# Patient Record
Sex: MALE | Race: WHITE | NOT HISPANIC OR LATINO | URBAN - METROPOLITAN AREA
[De-identification: names, ages, dates, MRNs, and addresses within clinical notes are randomized per-mention and may not be internally consistent; named-entity substitution may affect disease eponyms.]

---

## 2017-03-28 ENCOUNTER — EMERGENCY (EMERGENCY)
Facility: HOSPITAL | Age: 22
LOS: 0 days | Discharge: HOME | End: 2017-03-28
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
## 2017-06-27 DIAGNOSIS — Z88.0 ALLERGY STATUS TO PENICILLIN: ICD-10-CM

## 2017-06-27 DIAGNOSIS — S93.401A SPRAIN OF UNSPECIFIED LIGAMENT OF RIGHT ANKLE, INITIAL ENCOUNTER: ICD-10-CM

## 2017-06-27 DIAGNOSIS — M25.571 PAIN IN RIGHT ANKLE AND JOINTS OF RIGHT FOOT: ICD-10-CM

## 2022-01-11 ENCOUNTER — EMERGENCY (EMERGENCY)
Facility: HOSPITAL | Age: 27
LOS: 0 days | Discharge: HOME | End: 2022-01-11
Attending: EMERGENCY MEDICINE | Admitting: EMERGENCY MEDICINE
Payer: MEDICAID

## 2022-01-11 VITALS
SYSTOLIC BLOOD PRESSURE: 121 MMHG | HEART RATE: 71 BPM | DIASTOLIC BLOOD PRESSURE: 65 MMHG | WEIGHT: 177.91 LBS | OXYGEN SATURATION: 97 % | TEMPERATURE: 98 F | RESPIRATION RATE: 16 BRPM
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
## 2022-01-11 DIAGNOSIS — M25.571 PAIN IN RIGHT ANKLE AND JOINTS OF RIGHT FOOT: ICD-10-CM

## 2022-01-11 DIAGNOSIS — S83.90XA SPRAIN OF UNSPECIFIED SITE OF UNSPECIFIED KNEE, INITIAL ENCOUNTER: ICD-10-CM
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
## 2022-01-11 PROCEDURE — 73562 X-RAY EXAM OF KNEE 3: CPT | Mod: 26,RT

## 2022-01-11 PROCEDURE — 99283 EMERGENCY DEPT VISIT LOW MDM: CPT

## 2022-01-11 NOTE — ED PROVIDER NOTE - CLINICAL SUMMARY MEDICAL DECISION MAKING FREE TEXT BOX
pt presenting with R knee pain sp twisting mechanism while boxing. no numbness/focal weakness. +pain on WB. Well appearing, NAD, non toxic. NCAT PERRLA WWPx4 neuro non focal 2+ equal pulses throughout <2sec capillary refill throughout no gross deformities to knee. Comfortable with discharge and follow-up outpatient, strict return precautions given. Endorses understanding of all of this and aware that they can return at any time for new or concerning symptoms. No further questions or concerns at this time

## 2022-01-11 NOTE — ED PROVIDER NOTE - PATIENT PORTAL LINK FT
You can access the FollowMyHealth Patient Portal offered by [facility] by registering at the following website: [email or web address]. By joining [employer]’s FollowMyHealth portal, you will also be able to view your health information using other applications (apps) compatible with our system.

## 2022-01-11 NOTE — ED PROVIDER NOTE - NS ED ROS FT
ROS:     constitutional: no fever, weight loss  msk: right knee pain joint pain or difficulty ROM  skin: no laceration or swelling or bruising  neuro: no tingling , weakness , difficulty ambulation

## 2022-01-11 NOTE — ED PROVIDER NOTE - OBJECTIVE STATEMENT
27 y/o male presents to the ED with posterior right knee pain s/p injury prior to arrival. patient without swelling to knee. no deformity. patient c/o pain with extension. no tingling to toes. no hip or ankle pain. no back pain or abdominal pain. patient with prior injury to right knee.

## 2022-01-11 NOTE — ED ADULT TRIAGE NOTE - CHIEF COMPLAINT QUOTE
"I didn't stretch before my boxing workout. I threw a punch and immediately felt pressure to my right knee. It hurts to walk on; hurts to go up the stairs."

## 2022-01-11 NOTE — ED PROVIDER NOTE - CARE PROVIDER_API CALL
Julio C Joy)  Orthopaedic Surgery  [address] [place] [address]  Phone: [phone]  Fax: [phone]  Follow Up Time:

## 2022-01-11 NOTE — ED PROVIDER NOTE - PHYSICAL EXAMINATION
Vital Signs: I have reviewed the initial vital signs.  Constitutional: well-nourished, no acute distress  Musculoskeletal: right knee- +tenderness posterior knee, good ROM of extremity,  no bony tenderness, no deformity, good peripheral pulses  Integumentary: (-) laceration, (-) ecchymosis (-) swelling   Neurologic: awake, alert, extremities’ motor and sensory functions grossly intact, no focal deficits  heme: (-) no adenopathy (-)lymphangitis

## 2023-12-01 NOTE — ED ADULT NURSE NOTE - CAS DISCH ACCOMP BY

## 2025-04-03 ENCOUNTER — EMERGENCY (EMERGENCY)
Facility: HOSPITAL | Age: 30
LOS: 0 days | Discharge: HOME | End: 2025-04-03
Attending: EMERGENCY MEDICINE
Payer: MEDICAID

## 2025-04-03 VITALS
SYSTOLIC BLOOD PRESSURE: 120 MMHG | OXYGEN SATURATION: 99 % | RESPIRATION RATE: 18 BRPM | HEART RATE: 75 BPM | WEIGHT: 169.98 LBS | TEMPERATURE: 98 F | DIASTOLIC BLOOD PRESSURE: 84 MMHG

## 2025-04-03 PROBLEM — Z78.9 OTHER SPECIFIED HEALTH STATUS: Chronic | Status: ACTIVE | Noted: 2022-01-11

## 2025-04-03 PROCEDURE — 99284 EMERGENCY DEPT VISIT MOD MDM: CPT

## 2025-04-03 PROCEDURE — 99283 EMERGENCY DEPT VISIT LOW MDM: CPT | Mod: 25

## 2025-04-03 PROCEDURE — 73562 X-RAY EXAM OF KNEE 3: CPT | Mod: RT

## 2025-04-03 PROCEDURE — 73562 X-RAY EXAM OF KNEE 3: CPT | Mod: 26,RT

## 2025-04-03 RX ORDER — LIDOCAINE HYDROCHLORIDE 20 MG/ML
1 JELLY TOPICAL ONCE
Refills: 0 | Status: COMPLETED | OUTPATIENT
Start: 2025-04-03 | End: 2025-04-03

## 2025-04-03 RX ORDER — ACETAMINOPHEN 500 MG/5ML
975 LIQUID (ML) ORAL ONCE
Refills: 0 | Status: COMPLETED | OUTPATIENT
Start: 2025-04-03 | End: 2025-04-03

## 2025-04-03 RX ORDER — KETOROLAC TROMETHAMINE 30 MG/ML
30 INJECTION, SOLUTION INTRAMUSCULAR; INTRAVENOUS ONCE
Refills: 0 | Status: DISCONTINUED | OUTPATIENT
Start: 2025-04-03 | End: 2025-04-03

## 2025-04-03 RX ADMIN — Medication 975 MILLIGRAM(S): at 14:14

## 2025-04-03 RX ADMIN — LIDOCAINE HYDROCHLORIDE 1 PATCH: 20 JELLY TOPICAL at 14:14

## 2025-04-03 NOTE — ED PROVIDER NOTE - PATIENT PORTAL LINK FT
You can access the FollowMyHealth Patient Portal offered by [facility] by registering at the following website: [email or web address]. By joining [employer]’s FollowMyHealth portal, you will also be able to view your health information using other applications (apps) compatible with our system.

## 2025-04-03 NOTE — ED ADULT TRIAGE NOTE - CHIEF COMPLAINT QUOTE
pt c/o right knee pain, unable to straighten RLE or bear weight   states he fell on it snowboarding last week

## 2025-04-03 NOTE — ED PROVIDER NOTE - OBJECTIVE STATEMENT
29 M no hx presenting to ED for eval of right knee pain for few days s/p snowboarding. sts that he came off jump but landed oddly and since has bene having pain to knee with ROM. denies any trauma to head, loc, or blood thinners

## 2025-04-03 NOTE — ED ADULT NURSE NOTE - PRO INTERPRETER NEED 2
Before Your Surgery      Call your surgeon if there is any change in your health. This includes signs of a cold or flu (such as a sore throat, runny nose, cough, rash or fever).    Do not smoke, drink alcohol or take over the counter medicine (unless your surgeon or primary care doctor tells you to) for the 24 hours before and after surgery.    If you take prescribed drugs: Follow your doctor s orders about which medicines to take and which to stop until after surgery.    Eating and drinking prior to surgery: follow the instructions from your surgeon    Take a shower or bath the night before surgery. Use the soap your surgeon gave you to gently clean your skin. If you do not have soap from your surgeon, use your regular soap. Do not shave or scrub the surgery site.  Wear clean pajamas and have clean sheets on your bed.   
English

## 2025-04-03 NOTE — ED PROVIDER NOTE - CLINICAL SUMMARY MEDICAL DECISION MAKING FREE TEXT BOX
VSS.  N/V exam normal.  Patient with medial joint line tenderness, clicking and locking.  XRay  negative.  Cannot place immobilizer as patient unable to fully extend knee, pointing to likely meniscal impingement.  ACE wrap, crutches and f/u with Ortho.  Placed in ED referral program.  Strict return instructions discussed. VSS.  N/V exam normal.  Patient with medial joint line tenderness, clicking and locking.  XRay  negative.  Cannot place immobilizer as patient unable to fully extend knee, pointing to likely meniscal impingement.  ACE wrap, crutches and f/u with Ortho.  RICE instructions discussed. Placed in ED referral program.  Strict return instructions discussed.

## 2025-04-03 NOTE — ED PROVIDER NOTE - PHYSICAL EXAMINATION
VITAL SIGNS: I have reviewed nursing notes and confirm.  CONSTITUTIONAL:  in no acute distress.  SKIN: Skin exam is warm and dry, no acute rash.  HEAD: Normocephalic; atraumatic.  EYES: PERRL, EOM intact; conjunctiva and sclera clear.  ENT: No nasal discharge; airway clear.   NECK: Supple; non tender.  CARD: S1, S2 normal; no murmurs, gallops, or rubs. Regular rate and rhythm.  RESP: No wheezes, rales or rhonchi. Speaking in full sentences.   ABD: Normal bowel sounds; soft; non-distended; non-tender; No rebound or guarding. No CVA tenderness.  EXT: decreased ROM to right knee, No clubbing, cyanosis or edema.  NEURO: Alert, oriented. Grossly unremarkable. No focal deficits.

## 2025-04-03 NOTE — ED PROVIDER NOTE - NSFOLLOWUPINSTRUCTIONS_ED_ALL_ED_FT
Our Emergency Department Referral Coordinators will be reaching out to you in the next 24-48 hours from 9:00am to 5:00pm with a follow up appointment. Please expect a phone call from the hospital in that time frame. If you do not receive a call or if you have any questions or concerns, you can reach them at   [phone]    __________________________________________________________________________________________        Knee Pain    Knee pain is a very common symptom and can have many causes. Knee pain often goes away when you follow your health care provider's instructions for relieving pain and discomfort at home. However, knee pain can develop into a condition that needs treatment. Some conditions may include:     Arthritis caused by wear and tear (osteoarthritis).  Arthritis caused by swelling and irritation (rheumatoid arthritis or gout).  A cyst or growth in your knee.  An infection in your knee joint.  An injury that will not heal.  Damage, swelling, or irritation of the tissues that support your knee (torn ligaments or tendinitis).    If your knee pain continues, additional tests may be ordered to diagnose your condition. Tests may include X-rays or other imaging studies of your knee. You may also need to have fluid removed from your knee. Treatment for ongoing knee pain depends on the cause, but treatment may include:    Medicines to relieve pain or swelling.  Steroid injections in your knee.  Physical therapy.  Surgery.    HOME CARE INSTRUCTIONS  Take medicines only as directed by your health care provider.   Rest your knee and keep it raised (elevated) while you are resting.  Do not do things that cause or worsen pain.  Avoid high-impact activities or exercises, such as running, jumping rope, or doing jumping jacks.  Apply ice to the knee area:  Put ice in a plastic bag.  Place a towel between your skin and the bag.  Leave the ice on for 20 minutes, 2–3 times a day.  Ask your health care provider if you should wear an elastic knee support.  Keep a pillow under your knee when you sleep.  Lose weight if you are overweight. Extra weight can put pressure on your knee.  Do not use any tobacco products, including cigarettes, chewing tobacco, or electronic cigarettes. If you need help quitting, ask your health care provider. Smoking may slow the healing of any bone and joint problems that you may have.    SEEK MEDICAL CARE IF:  Your knee pain continues, changes, or gets worse.  You have a fever along with knee pain.  Your knee tone or locks up.  Your knee becomes more swollen.    SEEK IMMEDIATE MEDICAL CARE IF:  Your knee joint feels hot to the touch.  You have chest pain or trouble breathing.

## 2025-04-08 ENCOUNTER — EMERGENCY (EMERGENCY)
Facility: HOSPITAL | Age: 30
LOS: 0 days | Discharge: HOME | End: 2025-04-08
Attending: EMERGENCY MEDICINE
Payer: MEDICAID

## 2025-04-08 VITALS
OXYGEN SATURATION: 95 % | RESPIRATION RATE: 18 BRPM | SYSTOLIC BLOOD PRESSURE: 132 MMHG | DIASTOLIC BLOOD PRESSURE: 84 MMHG | HEART RATE: 77 BPM | TEMPERATURE: 98 F | WEIGHT: 175.05 LBS

## 2025-04-08 DIAGNOSIS — G89.29 OTHER CHRONIC PAIN: ICD-10-CM

## 2025-04-08 DIAGNOSIS — M25.461 EFFUSION, RIGHT KNEE: ICD-10-CM

## 2025-04-08 DIAGNOSIS — M25.561 PAIN IN RIGHT KNEE: ICD-10-CM

## 2025-04-08 LAB
ALBUMIN SERPL ELPH-MCNC: 4.2 G/DL — SIGNIFICANT CHANGE UP (ref 3.5–5.2)
ALP SERPL-CCNC: 58 U/L — SIGNIFICANT CHANGE UP (ref 30–115)
ALT FLD-CCNC: 10 U/L — SIGNIFICANT CHANGE UP (ref 0–41)
ANION GAP SERPL CALC-SCNC: 10 MMOL/L — SIGNIFICANT CHANGE UP (ref 7–14)
AST SERPL-CCNC: 14 U/L — SIGNIFICANT CHANGE UP (ref 0–41)
B PERT IGG+IGM PNL SER: ABNORMAL
BASOPHILS # BLD AUTO: 0.04 K/UL — SIGNIFICANT CHANGE UP (ref 0–0.2)
BASOPHILS NFR BLD AUTO: 0.5 % — SIGNIFICANT CHANGE UP (ref 0–1)
BILIRUB SERPL-MCNC: 0.4 MG/DL — SIGNIFICANT CHANGE UP (ref 0.2–1.2)
BUN SERPL-MCNC: 21 MG/DL — HIGH (ref 10–20)
CALCIUM SERPL-MCNC: 9.1 MG/DL — SIGNIFICANT CHANGE UP (ref 8.4–10.5)
CHLORIDE SERPL-SCNC: 105 MMOL/L — SIGNIFICANT CHANGE UP (ref 98–110)
CO2 SERPL-SCNC: 26 MMOL/L — SIGNIFICANT CHANGE UP (ref 17–32)
COLOR FLD: SIGNIFICANT CHANGE UP
CREAT SERPL-MCNC: 0.8 MG/DL — SIGNIFICANT CHANGE UP (ref 0.7–1.5)
CRP SERPL-MCNC: 30.8 MG/L — HIGH
EGFR: 123 ML/MIN/1.73M2 — SIGNIFICANT CHANGE UP
EGFR: 123 ML/MIN/1.73M2 — SIGNIFICANT CHANGE UP
EOSINOPHIL # BLD AUTO: 0.12 K/UL — SIGNIFICANT CHANGE UP (ref 0–0.7)
EOSINOPHIL NFR BLD AUTO: 1.4 % — SIGNIFICANT CHANGE UP (ref 0–8)
ERYTHROCYTE [SEDIMENTATION RATE] IN BLOOD: 17 MM/HR — HIGH (ref 0–10)
FLUID INTAKE SUBSTANCE CLASS: SIGNIFICANT CHANGE UP
GLUCOSE FLD-MCNC: 98 MG/DL — SIGNIFICANT CHANGE UP
GLUCOSE SERPL-MCNC: 96 MG/DL — SIGNIFICANT CHANGE UP (ref 70–99)
GRAM STN FLD: SIGNIFICANT CHANGE UP
HCT VFR BLD CALC: 39.4 % — LOW (ref 42–52)
HGB BLD-MCNC: 13.8 G/DL — LOW (ref 14–18)
IMM GRANULOCYTES NFR BLD AUTO: 0.4 % — HIGH (ref 0.1–0.3)
JOINT PATHOGENS PNL SNV NAA+NON-PROBE: SIGNIFICANT CHANGE UP
JOINT PCR SOURCE: SIGNIFICANT CHANGE UP
LACTATE SERPL-SCNC: 0.5 MMOL/L — LOW (ref 0.7–2)
LYMPHOCYTES # BLD AUTO: 1.59 K/UL — SIGNIFICANT CHANGE UP (ref 1.2–3.4)
LYMPHOCYTES # BLD AUTO: 19 % — LOW (ref 20.5–51.1)
LYMPHOCYTES # FLD: 9 — SIGNIFICANT CHANGE UP
MCHC RBC-ENTMCNC: 31.4 PG — HIGH (ref 27–31)
MCHC RBC-ENTMCNC: 35 G/DL — SIGNIFICANT CHANGE UP (ref 32–37)
MCV RBC AUTO: 89.7 FL — SIGNIFICANT CHANGE UP (ref 80–94)
MONOCYTES # BLD AUTO: 1.18 K/UL — HIGH (ref 0.1–0.6)
MONOCYTES NFR BLD AUTO: 14.1 % — HIGH (ref 1.7–9.3)
MONOS+MACROS # FLD: 10 % — SIGNIFICANT CHANGE UP
NEUTROPHILS # BLD AUTO: 5.42 K/UL — SIGNIFICANT CHANGE UP (ref 1.4–6.5)
NEUTROPHILS NFR BLD AUTO: 64.6 % — SIGNIFICANT CHANGE UP (ref 42.2–75.2)
NEUTROPHILS-BODY FLUID: 81 % — SIGNIFICANT CHANGE UP
NRBC BLD AUTO-RTO: 0 /100 WBCS — SIGNIFICANT CHANGE UP (ref 0–0)
PLATELET # BLD AUTO: 229 K/UL — SIGNIFICANT CHANGE UP (ref 130–400)
PMV BLD: 9.8 FL — SIGNIFICANT CHANGE UP (ref 7.4–10.4)
POTASSIUM SERPL-MCNC: 4.7 MMOL/L — SIGNIFICANT CHANGE UP (ref 3.5–5)
POTASSIUM SERPL-SCNC: 4.7 MMOL/L — SIGNIFICANT CHANGE UP (ref 3.5–5)
PROT FLD-MCNC: 4.5 G/DL — SIGNIFICANT CHANGE UP
PROT SERPL-MCNC: 6.7 G/DL — SIGNIFICANT CHANGE UP (ref 6–8)
RBC # BLD: 4.39 M/UL — LOW (ref 4.7–6.1)
RBC # FLD: 11.9 % — SIGNIFICANT CHANGE UP (ref 11.5–14.5)
RCV VOL RI: HIGH /UL (ref 0–0)
SODIUM SERPL-SCNC: 141 MMOL/L — SIGNIFICANT CHANGE UP (ref 135–146)
SPECIMEN SOURCE: SIGNIFICANT CHANGE UP
SYNOVIAL CRYSTALS CLARITY: ABNORMAL
SYNOVIAL CRYSTALS COLOR: ABNORMAL
SYNOVIAL CRYSTALS ID: ABNORMAL
SYNOVIAL CRYSTALS TUBE: SIGNIFICANT CHANGE UP
TOTAL NUCLEATED CELL COUNT, BODY FLUID: 2228 /UL — SIGNIFICANT CHANGE UP
TUBE TYPE: SIGNIFICANT CHANGE UP
WBC # BLD: 8.38 K/UL — SIGNIFICANT CHANGE UP (ref 4.8–10.8)
WBC # FLD AUTO: 8.38 K/UL — SIGNIFICANT CHANGE UP (ref 4.8–10.8)

## 2025-04-08 PROCEDURE — 36415 COLL VENOUS BLD VENIPUNCTURE: CPT

## 2025-04-08 PROCEDURE — 99284 EMERGENCY DEPT VISIT MOD MDM: CPT | Mod: 25

## 2025-04-08 PROCEDURE — 20610 DRAIN/INJ JOINT/BURSA W/O US: CPT | Mod: RT

## 2025-04-08 PROCEDURE — 76882 US LMTD JT/FCL EVL NVASC XTR: CPT | Mod: 26,RT

## 2025-04-08 PROCEDURE — 76882 US LMTD JT/FCL EVL NVASC XTR: CPT | Mod: RT

## 2025-04-08 PROCEDURE — 87070 CULTURE OTHR SPECIMN AEROBIC: CPT

## 2025-04-08 PROCEDURE — 99285 EMERGENCY DEPT VISIT HI MDM: CPT | Mod: 25

## 2025-04-08 PROCEDURE — 82945 GLUCOSE OTHER FLUID: CPT

## 2025-04-08 PROCEDURE — 87205 SMEAR GRAM STAIN: CPT

## 2025-04-08 PROCEDURE — 87015 SPECIMEN INFECT AGNT CONCNTJ: CPT

## 2025-04-08 PROCEDURE — 89060 EXAM SYNOVIAL FLUID CRYSTALS: CPT

## 2025-04-08 PROCEDURE — 84157 ASSAY OF PROTEIN OTHER: CPT

## 2025-04-08 PROCEDURE — 87075 CULTR BACTERIA EXCEPT BLOOD: CPT

## 2025-04-08 PROCEDURE — 89051 BODY FLUID CELL COUNT: CPT

## 2025-04-08 PROCEDURE — 86140 C-REACTIVE PROTEIN: CPT

## 2025-04-08 PROCEDURE — 85025 COMPLETE CBC W/AUTO DIFF WBC: CPT

## 2025-04-08 PROCEDURE — 87999 UNLISTED MICROBIOLOGY PX: CPT

## 2025-04-08 PROCEDURE — 96374 THER/PROPH/DIAG INJ IV PUSH: CPT | Mod: XU

## 2025-04-08 PROCEDURE — 83605 ASSAY OF LACTIC ACID: CPT

## 2025-04-08 PROCEDURE — 80053 COMPREHEN METABOLIC PANEL: CPT

## 2025-04-08 PROCEDURE — 85652 RBC SED RATE AUTOMATED: CPT

## 2025-04-08 RX ORDER — KETOROLAC TROMETHAMINE 30 MG/ML
15 INJECTION, SOLUTION INTRAMUSCULAR; INTRAVENOUS ONCE
Refills: 0 | Status: DISCONTINUED | OUTPATIENT
Start: 2025-04-08 | End: 2025-04-08

## 2025-04-08 RX ORDER — IBUPROFEN 200 MG
1 TABLET ORAL
Qty: 40 | Refills: 0
Start: 2025-04-08 | End: 2025-04-17

## 2025-04-08 RX ORDER — METHOCARBAMOL 500 MG/1
2 TABLET, FILM COATED ORAL
Qty: 18 | Refills: 0
Start: 2025-04-08 | End: 2025-04-10

## 2025-04-08 RX ADMIN — KETOROLAC TROMETHAMINE 15 MILLIGRAM(S): 30 INJECTION, SOLUTION INTRAMUSCULAR; INTRAVENOUS at 08:13

## 2025-04-08 NOTE — ED PROVIDER NOTE - PHYSICAL EXAMINATION
Initial vital signs reviewed.  General: NAD, nontoxic appearing.  HENT: AT/NC.  Eyes: non-injected conjunctivae b/l.  Neck: supple.  CV: RRR, no murmurs. 2+ distal pulses x4.  Pulm: nonlabored work of breathing, CTAB.  Abd: soft, nondistended, nontender.  MSK: Significant large area of edema to right knee.  Difficulty ranging secondary to swelling. NV intact distally.  Skin: warm, dry, well-perfused.  Neuro: A&Ox4.  Psych: appropriate mood and affect.

## 2025-04-08 NOTE — ED ADULT NURSE NOTE - CHIEF COMPLAINT QUOTE
Pt c/o R knee pain beginning a week and a half ago. States the pain has gotten worse and now his knee is swollen
0 = swallows foods/liquids without difficulty

## 2025-04-08 NOTE — ED PROVIDER NOTE - PATIENT PORTAL LINK FT
You can access the FollowMyHealth Patient Portal offered by [facility] by registering at the following website: [email or web address]. By joining [employer]’s FollowMyHealth portal, you will also be able to view your health information using other applications (apps) compatible with our system.

## 2025-04-08 NOTE — ED PROVIDER NOTE - PROGRESS NOTE DETAILS
Carl Salomon, DO: Arthrocentesis performed of right knee with significant improvement of edema.  Results with bloody synovial fluid without active infection.  Given patient orthopedic referral. Patient is well appearing, NAD, afebrile, hemodynamically stable. Any available tests and studies were discussed with patient and/or family. Discharged with instructions in further symptomatic care, return precautions, and need for PMD f/u.

## 2025-04-08 NOTE — ED PROCEDURE NOTE - ATTENDING CONTRIBUTION TO CARE
I was present for and supervised the key critical aspects of the procedures performed during the care of the patient.
I reviewed and verified the documentation and independently performed the documented MDM

## 2025-04-08 NOTE — ED ADULT TRIAGE NOTE - CHIEF COMPLAINT QUOTE
Pt c/o R knee pain beginning a week and a half ago. States the pain has gotten worse and now his knee is swollen

## 2025-04-08 NOTE — ED PROVIDER NOTE - CLINICAL SUMMARY MEDICAL DECISION MAKING FREE TEXT BOX
29-year-old male here for evaluation of right knee pain.  Patient reports about 2 weeks agowas skiiing fell and hurt his knee.  Patient was initially ambulating but now having worsening pain and swelling.  Patient was here on April 3, 4 days ago with negative right knee x-ray.  Here patient having worsening pain and swelling to the right knee on exam.  Patient has no redness to the right knee but there is swelling with limited range of motion and discomfort.  Arthrocentesis done showing bloody fluid.  Cell count and labs are reassuring.  Pt will need outpt ortho f/u

## 2025-04-08 NOTE — ED PROVIDER NOTE - NSFOLLOWUPINSTRUCTIONS_ED_ALL_ED_FT
We have placed a referral to ortho    Our Emergency Department Referral Coordinators will be reaching out to you in the next 24-48 hours from 9:00am to 5:00pm with a follow up appointment. Please expect a phone call from the hospital in that time frame. If you do not receive a call or if you have any questions or concerns, you can reach them at   [phone]    DISCHARGE INSTRUCTIONS:  Medicines:  •NSAIDs: These medicines decrease swelling and pain. Take as directed. NSAIDs can cause stomach bleeding and kidney problems if not taken correctly.    •Rest: You will need to rest your foot for 1 to 2 days after your injury. This will help decrease the risk of more damage.  •Ice: Ice helps decrease swelling and pain. Ice may also help prevent tissue damage. Use an ice pack, or put crushed ice in a plastic bag. Cover it with a towel and place it on your foot for 15 to 20 minutes every hour or as directed.  •Compression: Compression (tight hold) provides support and helps decrease swelling and movement so your foot can heal. You may be told to keep your foot wrapped with a tight elastic bandage. Follow instructions about how to apply your bandage. Do not massage your foot. You could cause more damage or pain.  •Elevation: Keep your foot raised above the level of your heart while you are sitting or lying down. This will help decrease or limit swelling. Use pillows, blankets, or rolled towels to elevate your foot comfortably.

## 2025-04-08 NOTE — ED PROVIDER NOTE - OBJECTIVE STATEMENT
29-year-old male without significant past medical hx who presents for increasing right knee swelling and pain. States that he chronic knee pain which he has never been eval for. He went snowboarding approximately 2 weeks ago without any inciting traumas, although unsure if he may have strained/sprained his knee from during snowboarding jumps.  He was evaluated last week at that time states that his swelling was more mild and was discharged home with reassuring knee x-ray.  However presents for progressively worsening swelling prominently this morning with severe pain and inability to range his knee.  Denies any fever, chills, numbness, tingling, weakness.

## 2025-04-09 ENCOUNTER — APPOINTMENT (OUTPATIENT)
Dept: ORTHOPEDIC SURGERY | Facility: CLINIC | Age: 30
End: 2025-04-09
Payer: MEDICAID

## 2025-04-09 ENCOUNTER — NON-APPOINTMENT (OUTPATIENT)
Age: 30
End: 2025-04-09

## 2025-04-09 ENCOUNTER — APPOINTMENT (OUTPATIENT)
Dept: MRI IMAGING | Facility: CLINIC | Age: 30
End: 2025-04-09
Payer: MEDICAID

## 2025-04-09 PROCEDURE — 73721 MRI JNT OF LWR EXTRE W/O DYE: CPT | Mod: RT

## 2025-04-09 PROCEDURE — 99203 OFFICE O/P NEW LOW 30 MIN: CPT

## 2025-04-10 ENCOUNTER — NON-APPOINTMENT (OUTPATIENT)
Age: 30
End: 2025-04-10

## 2025-04-10 DIAGNOSIS — S89.91XA UNSPECIFIED INJURY OF RIGHT LOWER LEG, INITIAL ENCOUNTER: ICD-10-CM

## 2025-04-22 ENCOUNTER — APPOINTMENT (OUTPATIENT)
Dept: ORTHOPEDIC SURGERY | Facility: CLINIC | Age: 30
End: 2025-04-22

## 2025-04-24 ENCOUNTER — APPOINTMENT (OUTPATIENT)
Dept: ORTHOPEDIC SURGERY | Facility: CLINIC | Age: 30
End: 2025-04-24

## 2025-06-20 NOTE — ED ADULT NURSE NOTE - CAS TRG GENERAL AIRWAY, MLM
Patent
decreased balance during turns/decreased sequencing ability/decreased step length/decreased weight-shifting ability